# Patient Record
(demographics unavailable — no encounter records)

---

## 2019-01-24 NOTE — NUR
BM

LAST REPORTED BM ON 1/23/19. I DID NOT SEE THIS BM BUT PATIENT REPORTS BM ON NORMAL PATTERN, 
SOFT CONSISTENCY AND BROWN IN COLOR.

-------------------------------------------------------------------------------

Addendum: 01/24/19 at 1148 by MEKA MORTON RN RN

-------------------------------------------------------------------------------

Amended: Links added.

## 2019-01-24 NOTE — NUR
DCP

CM met with pt and spouse discussed dc plans. Pt is independent prior to admission, lives at 
home with spouse. Has a walker ,shower chair, and cane. Pt feels safe to go back home, still 
drives, spouse able to assist with transportation and needs as necessary. DC plan to home 
once stable. CM to cont to follow up. 

-------------------------------------------------------------------------------

Addendum: 01/24/19 at 1116 by ROBERTO WHYTE LVN CM

-------------------------------------------------------------------------------

Amended: Links added.

## 2019-01-25 NOTE — NUR
INSTRUCTIONS

DISCHARGE INSTRUCTIONS GIVEN TO PATIENT USING TEACH BACK.  IV TO THE RIGHT ANTECUBITAL 
REMOVED WITH TIP INTACT.   DIRECT PRESSURE APPLIED UNTIL BLEEDING CONTROLLED THEN SITE 
COVERED WITH GAUZE AND SECURED WITH TAPE.  F/U APPOINTMENTS MADE.  NEW PRESCRIPTIONS PLACED 
IN PACKET ALONG WITH ALL PRINTED INSTRUCTIONS AND MEDICATION TEACHINGS.  NO QUESTIONS OR 
CONCERNS VOICED.  PENDING RIDE HOME.